# Patient Record
(demographics unavailable — no encounter records)

---

## 2024-10-10 NOTE — PHYSICAL EXAM
[No Acute Distress] : no acute distress [No Deformities] : no deformities [Low Lying Soft Palate] : low lying soft palate [III] : Mallampati Class: III [Normal Appearance] : normal appearance [No Neck Mass] : no neck mass [Normal Rate/Rhythm] : normal rate/rhythm [Normal S1, S2] : normal s1, s2 [No Murmurs] : no murmurs [No Resp Distress] : no resp distress [Clear to Auscultation Bilaterally] : clear to auscultation bilaterally [No Abnormalities] : no abnormalities [Normal Gait] : normal gait [No Clubbing] : no clubbing [No Cyanosis] : no cyanosis [No Edema] : no edema [FROM] : FROM [Normal Color/ Pigmentation] : normal color/ pigmentation [No Focal Deficits] : no focal deficits [Oriented x3] : oriented x3 [Normal Affect] : normal affect

## 2024-10-10 NOTE — REVIEW OF SYSTEMS
[Fatigue] : fatigue [Chest Tightness] : chest tightness [Wheezing] : wheezing [GERD] : gerd [Anxiety] : anxiety [Negative] : Endocrine [TextBox_3] : Morbid obesity [TextBox_44] : Congestive heart failure hyperlipidemia A-fib with prior history of ablation hypertension [TextBox_83] : Chronic renal disease

## 2024-10-10 NOTE — DISCUSSION/SUMMARY
[FreeTextEntry1] : [employer] certification [employer] exposure Certification diagnosis Multiple myeloma COPD asthma Past medical history as noted above Obstructive sleep apnea Hypertension Chronic atrial fibrillation Congestive heart failure Chronic renal disease Diabetes mellitus GERD Hyperlipidemia Morbid obesity  Blood work pending CBC, comprehensive metabolic profile lipid panel Maintain up-to-date colorectal screening Maintain up-to-date prostate cancer screening Maintain up-to-date vaccine protocol clued but not limited to COVID flu pneumonia RSV shingles Patient will follow-up with pulmonary Dr. Bermeo Patient was advised follow-up cardiology due to atrial fibrillation there is consideration patient states for an additional attempt at cardiac ablation with electrophysiology team

## 2024-10-10 NOTE — HISTORY OF PRESENT ILLNESS
[Never] : never [TextBox_4] : [employer] certification [employer] exposure Enrollee type survivor program New member  Certification diagnosis multiple myeloma diagnosis June 2019 COPD asthma  with history of bronchitis history pneumonia Postnasal drip sinus disease Obstructive sleep apnea Hypertension Atrial fibrillation History of congestive heart failure History of chronic renal disease History of COVID-19 infection Diabetes mellitus type 2 GERD Hyperlipidemia Morbid obesity

## 2024-10-10 NOTE — PROCEDURE
[FreeTextEntry1] : Blood draw  PFT October 10, 2024 Moderate obstructive ventilatory impairment Positive bronchodilator response FEV1 Lung volumes normal Positive air trapping Diffusion normal range 77% predicted.  EKG October 30, 2024 atrial fibrillation flutter PVCs ventricular trigeminy advise cardiology follow-up  Chest x-ray PA lateral October 10, 2024 Cardiac size normal Mild volume loss at right with ipsilateral tracheal deviation No appreciable pulmonary nodules infiltrates pleural effusion pneumothorax

## 2024-11-20 NOTE — HISTORY OF PRESENT ILLNESS
[FreeTextEntry1] : Mr. Roberts is a 59 year old male with a history of A fib, CHF, DM, GERD, HTN, low vit D, asthma, SOB, obesity, EDUIN, s/p 9/11 exposure who presents to the office for a follow up pulmonary evaluation. His chief complaint is  -he notes losing weight -he notes feeling improved  -he notes needing a sleep study  -he notes using his at home O2 which was feeding into AVAP -he notes confusion over who is delivering his O2 -he notes stress  -he notes while being off AVAP his heart rate dropped to 59%  -he notes his blood pressure was elevated as of this morning  -he notes issues with 9/[employer] foundation  -he notes having abnormal breathing exams  -he notes nocturnal hypoxemia  -he notes being on Mounjaro  -he notes diet is good  -he denies any headaches, nausea, emesis, fever, chills, sweats, chest pain, chest pressure, coughing, palpitations, diarrhea, constipation, dysphagia, vertigo, arthralgias, myalgias, leg swelling, itchy eyes, itchy ears, heartburn, reflux, or sour taste in the mouth.

## 2024-11-20 NOTE — ADDENDUM
[FreeTextEntry1] : Documented by Gabriel Solis acting as a scribe for Dr. Anatoly Beckham on 11/20/2024. All medical record entries made by the Scribe were at my, Dr. Anatoly Beckham's, direction and personally dictated by me on 11/20/2024. I have reviewed the chart and agree that the record accurately reflects my personal performance of the history, physical exam, assessment and plan. I have also personally directed, reviewed, and agree with the discharge instructions.

## 2024-11-20 NOTE — REASON FOR VISIT
[Follow-Up] : a follow-up visit [Parent] : parent [FreeTextEntry1] : MM diagnosis 7/2019, history of A fib, CHF, DM, GERD, HTN, low vit D, asthma, SOB, obesity, EDUIN, s/p 9/11 exposure

## 2024-11-20 NOTE — ASSESSMENT
[FreeTextEntry1] : Mr. Roberts is a 59 year old male presenting to the office s/p bariatric surgery. He has a history of asthma, allergies, GERD, OSAS, obesity, 9/11 exposure. s/p MM Dx, and current has cardiomyopathy and Afib. He is smouldering for MM. s/p Ablation for AFib receiving iron infusion-now on chemo for MM (Revlimid) -s/p covid 19 vaccine x2 - stable- s/p BMT ([facility])- eval (rejected 4/2022) - s/p COVID-19 7/2022. s/p PNA/CHF 5/2023, 10/2023; RSV - s/p evaluation w/ [facility] (9/11)- nocturnal hypoxemia   His shortness of breath is multifactorial due to: -obesity -out of shape/poor breathing mechanics -asthma -cardiac disease (CHF) -questionable PAH   problem 1: asthma (controlled) -s/p short course of prednisone 30mg for 5 days, then 20mg for 5 days, then 10mg for 5 days (12/2022) -continue to use Xopenex 1.25% by the nebulizer TID (first) -continue to use Symbicort 160 at 2 inhalations BID (second) or equivalent  -continue to use Spiriva 1 inhalation QD (third) -continue to use Singulair 10 mg before bed -Asthma is believed to be caused by inherited (genetic) and environmental factor, but its exact cause is unknown. Asthma may be triggered by allergens, lung infections, or irritants in the air. Asthma triggers are different for each person -Inhaler technique reviewed as well as oral hygiene techniques reviewed with patient. Avoidance of cold air, extremes of temperature, rescue inhaler should be used before exercise. Order of medication reviewed with patient. Recommended use of a cool mist humidifier in the bedroom. -Information sheet given to the patient to be reviewed, this medication is never to be used without consulting the prescribing physician. Proper dietary restraint is necessary specifically salt containing foods, if any reaction may occur should be reported.  problem 2: r/o PAH (WHO group 2 / CHF group) -Continue follow up with Dr. Ryan / CHF group -Disorder of the pulmonary arteries, important to distinguish the difference between pulmonary arterial hypertension which is idiopathic versus secondary pulmonary hypertension which is related to heart disease being diastolic dysfunction or congestive heart failure or other forms of pulmonary hypertension. Diagnostics include an initial echocardiogram evaluating the pulmonary artery pressures, if this is abnormal, to proceed with a VQ scan as well as a CTPA and an eventual right heart catheterization to absolutely confirm the echocardiogram findings. (No medication can be prescribed until the right heart catheterization). If present, the evaluation will include rheumatological blood testing, HIV testing, and potential evaluation for cirrhosis. Drug classes include PED5 (Revatio, Adcirca) ETRA (Tracleer, Macitentan, Letairis), Soluble guanylate cyclase (Adempas) Prostacyclins (Uptravi, Tyavso, Ventavis, Remodulin, or Orenitram derivatives).  problem 3: GERD -continue to use omeprazole 40 mg before breakfast -Things to avoid including overeating, spicy foods, tight clothing, eating within three hours of bed, this list is not all inclusive. -For treatment of reflux, possible options discussed including diet control, H2 blockers, PPIs, as well as coating motility agents discussed as treatment options. Timing of meals and proximity of last meal to sleep were discussed. If symptoms persist, a formal gastrointestinal evaluation is needed.  problem 4: post nasal drip syndrome/allergies -continue to use Olopatadine 0.6% 1 sniff each nostril BID -continue to use Clarinex 5 mg QHS -Environmental measures for allergies were encouraged including mattress and pillow cover, air purifier, and environmental controls.  problem 5: OSAS (compliant) -AVAPS in place ([facility])  -he needs adjustment and has been given a prescription for new supplies including Respironics Gel Full face mask -he is being set up for an in lab sleep study to determine the appropriate pressures (APAP titration for BiPAP) -Discussed the risks/associations with coronary artery disease, atrial fibrillation, arrhythmia, memory loss, issues with concentration, stroke risk, hypertension, nocturia, chronic reflux/Nicolas's esophagus some but not all inclusive. Treatment options discussed including CPAP/BiPAP machine, oral appliance, ProVent therapy, Oxy-Aid by [employer], new technologies, or positional sleep.  problem 5A: Nocturnal Hypoxemia  - check OPOX -supplemental O2 needed ([employer])  problem 6: morbid obesity -recommended Mounjaro 8/2024 -Recommend "Muniq" OTC for weight loss, energy, and blood sugar -recommended Berberine Synergy OTC -s/p gastric sleeve surgery -Weight loss, exercise, and diet control were discussed and are highly encouraged. Treatment options were given such as, aqua therapy, and contacting a nutritionist. Recommended to use the elliptical, stationary bike, less use of treadmill. Obesity is associated with worsening asthma, shortness of breath, and potential for cardiac disease, diabetes, and other underlying medical conditions.  problem 7: underlying cardiac disease (a-fib)/CHF--in remission -continue to regularly follow up with Dr. Jeff Ann  Problem 8: MM - on chemo (Dimple) - s/p iron infusions /Revlimed (off)- awaiting BMT ([facility]) - rejected 4/2022  Problem 9: Poor mechanics of breathing -Recommended Gerardo Del Castillo breathing techniques - Proper breathing techniques were reviewed with an emphasis on exhalation. Patient instructed to breath in for 1 second and out for four seconds. Patient was encouraged not to talk while walking.  Problem 10: COVID-19 precautionary Immune Support Recommendations: (on hold as per Dr Musa) s/p COVID-19 (s/p paxlovid 7/2022) -s/p COVID-19 vaccine x2 - Discussed and recommended to wait for the updated booster - Recommended discussing Brigitteusheld -covid 19 vaccine discussed with patient -OTC Vitamin C 500mg BID -OTC Quercetin 250-500mg BID -OTC Zinc 75-100mg per day -OTC Melatonin 1 or 2mg a night -OTC Vitamin D 1-4000mg per day -OTC Tonic Water 8oz per day -Water 0.5-1 gallon per day  problem 11: health maintenance -recommended a yearly flu shot after October 15 (2024 pending) -recommended strep pneumonia vaccines: Prevnar-13 vaccine, followed by Pneumo vaccine 23 on year following (1/2020) -recommended early intervention for URIs -recommended osteoporosis evaluations -recommended early dermatological evaluations -recommended after the age of 50 to the age of 70, colonoscopy every 5 years  F/P in 2 months He is encouraged to call with any changes, concerns, or questions.

## 2024-11-20 NOTE — PHYSICAL EXAM
[No Acute Distress] : no acute distress [Normal Oropharynx] : normal oropharynx [Normal Appearance] : normal appearance [No Neck Mass] : no neck mass [Normal Rate/Rhythm] : normal rate/rhythm [Normal S1, S2] : normal s1, s2 [No Murmurs] : no murmurs [No Resp Distress] : no resp distress [Clear to Auscultation Bilaterally] : clear to auscultation bilaterally [No Abnormalities] : no abnormalities [Benign] : benign [Normal Gait] : normal gait [No Clubbing] : no clubbing [No Cyanosis] : no cyanosis [FROM] : FROM [Normal Color/ Pigmentation] : normal color/ pigmentation [No Focal Deficits] : no focal deficits [Oriented x3] : oriented x3 [Normal Affect] : normal affect [III] : Mallampati Class: III [TextBox_68] : I:E ratio 1:3; clear [TextBox_105] : 1+ LE Edema

## 2025-02-07 NOTE — HISTORY OF PRESENT ILLNESS
[de-identified] : follow up chronic conditions myelma controlled  lost 15 lbs had some sfx mj > 5mg eats one meal a day walking now driving overally doing great much better

## 2025-03-20 NOTE — ASSESSMENT
[FreeTextEntry1] : Mr. Roberts is a 59 year old male presenting to the office s/p bariatric surgery. He has a history of asthma, allergies, GERD, OSAS, obesity, 9/11 exposure. s/p MM Dx, and current has cardiomyopathy and Afib. He is smouldering for MM. s/p Ablation for AFib receiving iron infusion-now on chemo for MM (Revlimid) -s/p covid 19 vaccine x2 - stable- s/p BMT ([facility])- eval (rejected 4/2022) - s/p COVID-19 7/2022. s/p PNA/CHF 5/2023, 10/2023; RSV - s/p evaluation w/ [facility] (9/11)- nocturnal hypoxemia - mild SOB / NIV concerns for travel  His shortness of breath is multifactorial due to: -obesity -out of shape/poor breathing mechanics -asthma -cardiac disease (CHF) -questionable PAH   problem 1: asthma (controlled) -s/p short course of prednisone 30mg for 5 days, then 20mg for 5 days, then 10mg for 5 days (12/2022) -continue to use Xopenex 1.25% by the nebulizer TID (first) -continue to use Symbicort 160 at 2 inhalations BID (second) or equivalent = Breo 200 1 inhalation QD -continue to use Spiriva 1 inhalation QD (third) -continue to use Singulair 10 mg before bed -Asthma is believed to be caused by inherited (genetic) and environmental factor, but its exact cause is unknown. Asthma may be triggered by allergens, lung infections, or irritants in the air. Asthma triggers are different for each person -Inhaler technique reviewed as well as oral hygiene techniques reviewed with patient. Avoidance of cold air, extremes of temperature, rescue inhaler should be used before exercise. Order of medication reviewed with patient. Recommended use of a cool mist humidifier in the bedroom. -Information sheet given to the patient to be reviewed, this medication is never to be used without consulting the prescribing physician. Proper dietary restraint is necessary specifically salt containing foods, if any reaction may occur should be reported.  problem 2: r/o PAH (WHO group 2 / CHF group) -Continue follow up with Dr. Ryan / CHF group -Disorder of the pulmonary arteries, important to distinguish the difference between pulmonary arterial hypertension which is idiopathic versus secondary pulmonary hypertension which is related to heart disease being diastolic dysfunction or congestive heart failure or other forms of pulmonary hypertension. Diagnostics include an initial echocardiogram evaluating the pulmonary artery pressures, if this is abnormal, to proceed with a VQ scan as well as a CTPA and an eventual right heart catheterization to absolutely confirm the echocardiogram findings. (No medication can be prescribed until the right heart catheterization). If present, the evaluation will include rheumatological blood testing, HIV testing, and potential evaluation for cirrhosis. Drug classes include PED5 (Revatio, Adcirca) ETRA (Tracleer, Macitentan, Letairis), Soluble guanylate cyclase (Adempas) Prostacyclins (Uptravi, Tyavso, Ventavis, Remodulin, or Orenitram derivatives).  problem 3: GERD -continue to use omeprazole 40 mg before breakfast -Things to avoid including overeating, spicy foods, tight clothing, eating within three hours of bed, this list is not all inclusive. -For treatment of reflux, possible options discussed including diet control, H2 blockers, PPIs, as well as coating motility agents discussed as treatment options. Timing of meals and proximity of last meal to sleep were discussed. If symptoms persist, a formal gastrointestinal evaluation is needed.  problem 4: post nasal drip syndrome/allergies -continue to use Olopatadine 0.6% 1 sniff each nostril BID -continue to use Clarinex 5 mg QHS -Environmental measures for allergies were encouraged including mattress and pillow cover, air purifier, and environmental controls.  problem 5: OSAS (compliant) -IVAPS in place ([employer]) = Airfit full FM F20 large; EPAP 11/min PS=5/max PS=15/6 liters/RR 16 -repeat split night with CPAP bilateral filtration -he needs adjustment and has been given a prescription for new supplies including Respironics Gel Full face mask -he is being set up for an in lab sleep study to determine the appropriate pressures (APAP titration for BiPAP) -Discussed the risks/associations with coronary artery disease, atrial fibrillation, arrhythmia, memory loss, issues with concentration, stroke risk, hypertension, nocturia, chronic reflux/Nicolas's esophagus some but not all inclusive. Treatment options discussed including CPAP/BiPAP machine, oral appliance, ProVent therapy, Oxy-Aid by [employer], new technologies, or positional sleep.  problem 5A: Nocturnal Hypoxemia  - check OPOX -supplemental O2 needed ([facility])  problem 6: morbid obesity -recommended Mounjaro 8/2024 -Recommend "Muniq" OTC for weight loss, energy, and blood sugar -recommended Berberine Synergy OTC -s/p gastric sleeve surgery -Weight loss, exercise, and diet control were discussed and are highly encouraged. Treatment options were given such as, aqua therapy, and contacting a nutritionist. Recommended to use the elliptical, stationary bike, less use of treadmill. Obesity is associated with worsening asthma, shortness of breath, and potential for cardiac disease, diabetes, and other underlying medical conditions.  problem 7: underlying cardiac disease (a-fib)/CHF--in remission -continue to regularly follow up with Dr. Jeff Ann  Problem 8: MM - on chemo (Dimple) - s/p iron infusions /Revlimed (off)- awaiting BMT ([facility]) - rejected 4/2022  Problem 9: Poor mechanics of breathing -Recommended Gerardo Mccoy and Michael breathing techniques - Proper breathing techniques were reviewed with an emphasis on exhalation. Patient instructed to breath in for 1 second and out for four seconds. Patient was encouraged not to talk while walking.  Problem 10: COVID-19 precautionary Immune Support Recommendations: (on hold as per Dr Musa) s/p COVID-19 (s/p paxlovid 7/2022) -s/p COVID-19 vaccine x2 - Discussed and recommended to wait for the updated booster - Recommended discussing Evusheld -covid 19 vaccine discussed with patient -OTC Vitamin C 500mg BID -OTC Quercetin 250-500mg BID -OTC Zinc 75-100mg per day -OTC Melatonin 1 or 2mg a night -OTC Vitamin D 1-4000mg per day -OTC Tonic Water 8oz per day -Water 0.5-1 gallon per day  problem 11: health maintenance -recommended a yearly flu shot after October 15 (2024 pending) -recommended strep pneumonia vaccines: Prevnar-13 vaccine, followed by Pneumo vaccine 23 on year following (1/2020) -recommended early intervention for URIs -recommended osteoporosis evaluations -recommended early dermatological evaluations -recommended after the age of 50 to the age of 70, colonoscopy every 5 years  F/P in 2 months He is encouraged to call with any changes, concerns, or questions.

## 2025-03-20 NOTE — PROCEDURE
[FreeTextEntry1] : Sleep study (12/19/24) revealed sleep apnea with an AHI/RDI of 10.6, and a low oxygen saturation of 85%. Frequency of sleep disordered breathing was reduced to WNL with EPAP 11 cm H2O, PS min 5 cm H2O, PS Max 15 cm H2O, Target Va 6L, and RR 16 bpm, TCO2 peaked at 39 mm Hg. Sleep continuity was improved with IVAPS. Oxygen saturation was improved with IVAPS.  PFTs revealed moderate restrictive and mild obstructive dysfunction; FEV1 was 1.70 L, which is 44% of predicted; normal flow volume loop. ANGLE: MIP 64%, MEP 76% PFTs were performed to evaluate for SOB  FENO was 17; a normal value being less than 25 Fractional exhaled nitric oxide (FENO) is regarded as a simple, noninvasive method for assessing eosinophilic airway inflammation. Produced by a variety of cells within the lung, nitric oxide (NO) concentrations are generally low in healthy individuals. However, high concentrations of NO appear to be involved in nonspecific host defense mechanisms and chronic inflammatory diseases such as asthma. The American Thoracic Society (ATS) therefore has recommended using FENO to aid in the diagnosis and monitoring of eosinophilic airway inflammation and asthma, and for identifying steroid responsive individuals whose chronic respiratory symptoms may be caused by airway inflammation.

## 2025-03-20 NOTE — ADDENDUM
[FreeTextEntry1] : Documented by Tucker Lowe acting as a scribe for Dr. Anatoly Beckham on 03/20/2025. All medical record entries made by the Scribe were at my, Dr. Anatoly Beckham's, direction and personally dictated by me on 03/20/2025. I have reviewed the chart and agree that the record accurately reflects my personal performance of the history, physical exam, assessment and plan. I have also personally directed, reviewed, and agree with the discharge instructions.

## 2025-03-20 NOTE — PHYSICAL EXAM
[No Acute Distress] : no acute distress [Normal Oropharynx] : normal oropharynx [III] : Mallampati Class: III [Normal Appearance] : normal appearance [No Neck Mass] : no neck mass [Normal Rate/Rhythm] : normal rate/rhythm [Normal S1, S2] : normal s1, s2 [No Murmurs] : no murmurs [No Resp Distress] : no resp distress [Clear to Auscultation Bilaterally] : clear to auscultation bilaterally [No Abnormalities] : no abnormalities [Benign] : benign [Normal Gait] : normal gait [No Clubbing] : no clubbing [No Cyanosis] : no cyanosis [FROM] : FROM [Normal Color/ Pigmentation] : normal color/ pigmentation [No Focal Deficits] : no focal deficits [Oriented x3] : oriented x3 [Normal Affect] : normal affect [TextBox_68] : I:E ratio 1:3; clear [TextBox_105] : 1+ LE Edema

## 2025-03-20 NOTE — HISTORY OF PRESENT ILLNESS
[FreeTextEntry1] : Mr. Roberts is a 59 year old male with a history of A fib, CHF, DM, GERD, HTN, low vit D, asthma, SOB, obesity, EDUIN, s/p 9/11 exposure who presents to the office for a follow up pulmonary evaluation. His chief complaint is  -he notes feeling more out of breath and it feels more labored -he notes switching to the powder inhaler which he doesn't like -he notes weight is stable -he notes dysphagia with both liquids and solids -he notes energy levels are 7/10 -he notes taking turmeric supplements -he notes water retention -he notes his myeloma is still not in remission   -he denies any headaches, nausea, emesis, fever, chills, sweats, chest pain, chest pressure, coughing, wheezing, palpitations, diarrhea, constipation, dysphagia, vertigo, arthralgias, myalgias, leg swelling, itchy eyes, itchy ears, heartburn, reflux, or sour taste in the mouth.

## 2025-05-23 NOTE — HISTORY OF PRESENT ILLNESS
[FreeTextEntry1] : leg swelling and back  cyst [de-identified] : pt had cyst on his back drained and on abx and medihoney ; old wound he got while hospitalized banged legs LE edema

## 2025-05-23 NOTE — PHYSICAL EXAM
[No Acute Distress] : no acute distress [Well Nourished] : well nourished [Well Developed] : well developed [Well-Appearing] : well-appearing [Normal Sclera/Conjunctiva] : normal sclera/conjunctiva [PERRL] : pupils equal round and reactive to light [EOMI] : extraocular movements intact [Normal Outer Ear/Nose] : the outer ears and nose were normal in appearance [Normal Oropharynx] : the oropharynx was normal [No JVD] : no jugular venous distention [No Lymphadenopathy] : no lymphadenopathy [Supple] : supple [Thyroid Normal, No Nodules] : the thyroid was normal and there were no nodules present [No Respiratory Distress] : no respiratory distress  [No Accessory Muscle Use] : no accessory muscle use [Clear to Auscultation] : lungs were clear to auscultation bilaterally [Normal Rate] : normal rate  [Regular Rhythm] : with a regular rhythm [Normal S1, S2] : normal S1 and S2 [No Murmur] : no murmur heard [No Carotid Bruits] : no carotid bruits [No Abdominal Bruit] : a ~M bruit was not heard ~T in the abdomen [No Varicosities] : no varicosities [Pedal Pulses Present] : the pedal pulses are present [No Palpable Aorta] : no palpable aorta [No Extremity Clubbing/Cyanosis] : no extremity clubbing/cyanosis [Soft] : abdomen soft [Non Tender] : non-tender [Non-distended] : non-distended [No Masses] : no abdominal mass palpated [No HSM] : no HSM [Normal Bowel Sounds] : normal bowel sounds [Normal Posterior Cervical Nodes] : no posterior cervical lymphadenopathy [Normal Anterior Cervical Nodes] : no anterior cervical lymphadenopathy [No CVA Tenderness] : no CVA  tenderness [No Spinal Tenderness] : no spinal tenderness [No Joint Swelling] : no joint swelling [Grossly Normal Strength/Tone] : grossly normal strength/tone [No Rash] : no rash [Coordination Grossly Intact] : coordination grossly intact [No Focal Deficits] : no focal deficits [Normal Gait] : normal gait [Deep Tendon Reflexes (DTR)] : deep tendon reflexes were 2+ and symmetric [Normal Affect] : the affect was normal [Normal Insight/Judgement] : insight and judgment were intact [de-identified] : venous stasis bilat

## 2025-06-23 NOTE — DISCUSSION/SUMMARY
[de-identified] : After a through history, full examination and review of x-rays. 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 Based upon the patients continued symptoms and failure to respond to conservative treatment. Which includes exercises, physical therapy, weight management, pain meds and NSAIDs. I have recommended a left total hip replacement for this patient. A long discussion took place with the patient describing what a total joint replacement is and what the procedure would entail.  I also explained the difference between a posterior hip replacement versus an anterior approached hip replacement; and which would be more advantageous for this patient.  It was decided that a left total hip replacement from the anterior approach would be performed.  A total hip model, similar to the implant that will be used during the operation was utilized to demonstrate and to discuss the various bearing surfaces of the implants. The benefits of surgery were discussed with the patient including the potential for improving his/her current clinical condition through operative intervention. Alternatives to surgical intervention including continued conservative management were also discussed in detail.   The hospitalization and post-operative care and rehabilitation were also discussed. The use of perioperative antibiotics and DVT prophylaxis were discussed. The risk, benefits and alternatives to a surgical intervention were discussed at length with the patient. The patient was also advised of risks related to the medical comorbidities and elevated body mass index (BMI).  A lengthy discussion took place to review the most common complications including but not limited to: deep vein thrombosis, pulmonary embolus, heart attack, stroke, infection, wound breakdown, numbness, damage to nerves, tendon, muscles, arteries or other blood vessels, death and other possible complications from anesthesia. The patient was told that we will take steps to minimize these risks by using sterile technique, antibiotics and DVT prophylaxis when appropriate and follow the patient postoperatively in the office setting to monitor progress. The possibility of recurrent pain, no improvement in pain and actual worsening of pain were also discussed with the patient.  The patient was advised of the goals, alternatives, risks and benefits of a 3 week course of Celebrex 200 mg BID to prevent Heterotopic Ossification seen in patients post total hip arthroplasty. If this is medically contraindicated the alternative would be a single dose (800cGy) radiation treatment to the hip implant site performed pre-operatively. A consultation with the Radiation Oncologist at [facility] will then be required.  The discharge plan of care focused on the patient going home following surgery.  I encouraged the patient to make the necessary arrangements to have someone stay with them when they are discharged home.  Following discharge, a home care nurse will visit the patient.  They will open your home care case and request home physical therapy services.  Home physical therapy will commence following discharge provided it is appropriate and covered by the health insurance benefit plan.   All questions were answered to the satisfaction of the patient. The treatment plan of care, as well as a model of a total hip equivalent to the one that will be used for their total joint replacement, was shared with the patient.  The patient agreed to the plan of care as well as the use of implants in their total joint replacement. The patient was advised that they will require a medical preoperative risk evaluation by their PCP. Further medical subspecialty clearances such as cardiac may be indicated if felt needed by their PCP. The patient participated in an interactive discussion of the THR implant planned for their surgery with questions answered, agreed with the treatment plan, and has decided to move forward with elective THR  as planned. The patient however has numerous comorbidities in regards to his past medical history.  He does have a history of having multiple myeloma for which she does receive chemo.  As well as CHF sleep apnea and peripheral vascular disease.  It is strongly recommend that he follow-up with his cardiologist and begin with an echo cardiogram to see if he is even clearable for surgery.  If so he would then speak to our surgical coordinator to arrange a date of surgery.  But would need medical clearance from his primary care physician, cardiologist, and vascular specialist.  45 minutes were spent, face to face, in direct consultation with the patient. This includes reviewing the natural history of their Dx., eliciting the history, performing an orthopedic exam, review of the x-ray findings, forming a differential Dx and discussing all treatment options. This Includes both surgical and non-surgical treatments. I also reviewed all the risks and benefits of non-operative & operative Tx options, future impact into orthopedic functions/problems, activity restrictions both at home and at work, and all follow up requirements.

## 2025-06-23 NOTE — DISCUSSION/SUMMARY
[de-identified] : After a through history, full examination and review of x-rays. 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 Based upon the patients continued symptoms and failure to respond to conservative treatment. Which includes exercises, physical therapy, weight management, pain meds and NSAIDs. I have recommended a left total hip replacement for this patient. A long discussion took place with the patient describing what a total joint replacement is and what the procedure would entail.  I also explained the difference between a posterior hip replacement versus an anterior approached hip replacement; and which would be more advantageous for this patient.  It was decided that a left total hip replacement from the anterior approach would be performed.  A total hip model, similar to the implant that will be used during the operation was utilized to demonstrate and to discuss the various bearing surfaces of the implants. The benefits of surgery were discussed with the patient including the potential for improving his/her current clinical condition through operative intervention. Alternatives to surgical intervention including continued conservative management were also discussed in detail.   The hospitalization and post-operative care and rehabilitation were also discussed. The use of perioperative antibiotics and DVT prophylaxis were discussed. The risk, benefits and alternatives to a surgical intervention were discussed at length with the patient. The patient was also advised of risks related to the medical comorbidities and elevated body mass index (BMI).  A lengthy discussion took place to review the most common complications including but not limited to: deep vein thrombosis, pulmonary embolus, heart attack, stroke, infection, wound breakdown, numbness, damage to nerves, tendon, muscles, arteries or other blood vessels, death and other possible complications from anesthesia. The patient was told that we will take steps to minimize these risks by using sterile technique, antibiotics and DVT prophylaxis when appropriate and follow the patient postoperatively in the office setting to monitor progress. The possibility of recurrent pain, no improvement in pain and actual worsening of pain were also discussed with the patient.  The patient was advised of the goals, alternatives, risks and benefits of a 3 week course of Celebrex 200 mg BID to prevent Heterotopic Ossification seen in patients post total hip arthroplasty. If this is medically contraindicated the alternative would be a single dose (800cGy) radiation treatment to the hip implant site performed pre-operatively. A consultation with the Radiation Oncologist at [facility] will then be required.  The discharge plan of care focused on the patient going home following surgery.  I encouraged the patient to make the necessary arrangements to have someone stay with them when they are discharged home.  Following discharge, a home care nurse will visit the patient.  They will open your home care case and request home physical therapy services.  Home physical therapy will commence following discharge provided it is appropriate and covered by the health insurance benefit plan.   All questions were answered to the satisfaction of the patient. The treatment plan of care, as well as a model of a total hip equivalent to the one that will be used for their total joint replacement, was shared with the patient.  The patient agreed to the plan of care as well as the use of implants in their total joint replacement. The patient was advised that they will require a medical preoperative risk evaluation by their PCP. Further medical subspecialty clearances such as cardiac may be indicated if felt needed by their PCP. The patient participated in an interactive discussion of the THR implant planned for their surgery with questions answered, agreed with the treatment plan, and has decided to move forward with elective THR  as planned. The patient however has numerous comorbidities in regards to his past medical history.  He does have a history of having multiple myeloma for which she does receive chemo.  As well as CHF sleep apnea and peripheral vascular disease.  It is strongly recommend that he follow-up with his cardiologist and begin with an echo cardiogram to see if he is even clearable for surgery.  If so he would then speak to our surgical coordinator to arrange a date of surgery.  But would need medical clearance from his primary care physician, cardiologist, and vascular specialist.  45 minutes were spent, face to face, in direct consultation with the patient. This includes reviewing the natural history of their Dx., eliciting the history, performing an orthopedic exam, review of the x-ray findings, forming a differential Dx and discussing all treatment options. This Includes both surgical and non-surgical treatments. I also reviewed all the risks and benefits of non-operative & operative Tx options, future impact into orthopedic functions/problems, activity restrictions both at home and at work, and all follow up requirements.

## 2025-06-23 NOTE — HISTORY OF PRESENT ILLNESS
[de-identified] : Patient is a 60-year-old male who presents today for an evaluation regarding his left hip.  He states that he has had pain for many years and is progressively getting worse.  In fact he had an injury when he was in his 20s which resulted in his significant injury to his left hip.  And noticed that over the years it became much more painful and much more stiff.  He presents today stating that the pain is a 5 on a scale of 1-10.  But is further exacerbated by any activities simply standing and walking.  He states that when he walks he notices that his leg is externally rotated and is unable to bring it back to a neutral position.  Due to this he has noticed significant clicking sensation a tingling and numbing as well as a marked stiffness within his hip.  He states that it is mostly on the lateral aspect of the hip which does radiate into the groin.  But also has some discomfort radiating more distally into the thigh/leg.  He has great difficulty with stairs and especially when trying to stand from a seated position.  He does use a cane when ambulating due to the level of pain stiffness and the possibility of giving way.  He states that he has been seen in the past over the course of years and was told that he would eventually need hip replacement surgery.  Unfortunately due to numerous past medical history conditions.  Surgery had been delayed.  He returns today for an evaluation regarding this left hip.

## 2025-06-23 NOTE — PHYSICAL EXAM
[Antalgic] : antalgic [Wide-Based] : wide-based [Shuffling] : shuffling [Cane] : ambulates with cane [LE] : Sensory: Intact in bilateral lower extremities [ALL] : dorsalis pedis, posterior tibial, femoral, popliteal, and radial 2+ and symmetric bilaterally [de-identified] : On physical examination of the left hip.  The patient is currently ambulating with the use of a cane.  And walks with his leg externally rotated.  On physical examination the skin is clean dry and intact.  With no evidence of infection superficial or deep.  There is no redness swelling heat or discharge noted.  It is noted that even when laying the patient has a external rotation of approximately 30 degrees.  And cannot be reduced to neutral position.  This is further worsened with hip flexion as his hip does flare externally and outward.  He has significant decreased range of motion when compared to his left to his right knee.  Particularly worse with internal and external rotation which also elicits an marked discomfort.  There is a limb length discrepancy of less than 1 cm with the left side being shorter.  He is neurovascularly intact with no evidence of any motor or sensory deficit.  Patient has good distal pulses no calf tenderness. [de-identified] : X-rays of the left hip were taken today. Two views were taken. This includes a Pelvis AP and lateral hip. It reveals severe osteoarthritic changes. There is significant joint space narrowing particularly at the superior pole with osteophyte formation, as well as areas of sclerosis. This is compatible with severe DJD of the left hip. There is no evidence of any fracture or dislocation.

## 2025-06-23 NOTE — PHYSICAL EXAM
[Antalgic] : antalgic [Wide-Based] : wide-based [Shuffling] : shuffling [Cane] : ambulates with cane [LE] : Sensory: Intact in bilateral lower extremities [ALL] : dorsalis pedis, posterior tibial, femoral, popliteal, and radial 2+ and symmetric bilaterally [de-identified] : On physical examination of the left hip.  The patient is currently ambulating with the use of a cane.  And walks with his leg externally rotated.  On physical examination the skin is clean dry and intact.  With no evidence of infection superficial or deep.  There is no redness swelling heat or discharge noted.  It is noted that even when laying the patient has a external rotation of approximately 30 degrees.  And cannot be reduced to neutral position.  This is further worsened with hip flexion as his hip does flare externally and outward.  He has significant decreased range of motion when compared to his left to his right knee.  Particularly worse with internal and external rotation which also elicits an marked discomfort.  There is a limb length discrepancy of less than 1 cm with the left side being shorter.  He is neurovascularly intact with no evidence of any motor or sensory deficit.  Patient has good distal pulses no calf tenderness. [de-identified] : X-rays of the left hip were taken today. Two views were taken. This includes a Pelvis AP and lateral hip. It reveals severe osteoarthritic changes. There is significant joint space narrowing particularly at the superior pole with osteophyte formation, as well as areas of sclerosis. This is compatible with severe DJD of the left hip. There is no evidence of any fracture or dislocation.

## 2025-06-23 NOTE — HISTORY OF PRESENT ILLNESS
[de-identified] : Patient is a 60-year-old male who presents today for an evaluation regarding his left hip.  He states that he has had pain for many years and is progressively getting worse.  In fact he had an injury when he was in his 20s which resulted in his significant injury to his left hip.  And noticed that over the years it became much more painful and much more stiff.  He presents today stating that the pain is a 5 on a scale of 1-10.  But is further exacerbated by any activities simply standing and walking.  He states that when he walks he notices that his leg is externally rotated and is unable to bring it back to a neutral position.  Due to this he has noticed significant clicking sensation a tingling and numbing as well as a marked stiffness within his hip.  He states that it is mostly on the lateral aspect of the hip which does radiate into the groin.  But also has some discomfort radiating more distally into the thigh/leg.  He has great difficulty with stairs and especially when trying to stand from a seated position.  He does use a cane when ambulating due to the level of pain stiffness and the possibility of giving way.  He states that he has been seen in the past over the course of years and was told that he would eventually need hip replacement surgery.  Unfortunately due to numerous past medical history conditions.  Surgery had been delayed.  He returns today for an evaluation regarding this left hip.